# Patient Record
Sex: MALE | Race: WHITE | NOT HISPANIC OR LATINO | Employment: FULL TIME | URBAN - METROPOLITAN AREA
[De-identification: names, ages, dates, MRNs, and addresses within clinical notes are randomized per-mention and may not be internally consistent; named-entity substitution may affect disease eponyms.]

---

## 2019-12-04 ENCOUNTER — OFFICE VISIT (OUTPATIENT)
Dept: ORTHOPEDIC SURGERY | Facility: CLINIC | Age: 34
End: 2019-12-04

## 2019-12-04 VITALS — WEIGHT: 217.4 LBS | HEIGHT: 67 IN | OXYGEN SATURATION: 98 % | BODY MASS INDEX: 34.12 KG/M2 | HEART RATE: 83 BPM

## 2019-12-04 DIAGNOSIS — M23.92 KNEE INTERNAL DERANGEMENT, LEFT: ICD-10-CM

## 2019-12-04 DIAGNOSIS — M25.561 PAIN IN BOTH KNEES, UNSPECIFIED CHRONICITY: Primary | ICD-10-CM

## 2019-12-04 DIAGNOSIS — M25.562 PAIN IN BOTH KNEES, UNSPECIFIED CHRONICITY: Primary | ICD-10-CM

## 2019-12-04 DIAGNOSIS — M94.261 CHONDROMALACIA OF KNEE, RIGHT: ICD-10-CM

## 2019-12-04 PROCEDURE — 99204 OFFICE O/P NEW MOD 45 MIN: CPT | Performed by: ORTHOPAEDIC SURGERY

## 2019-12-04 RX ORDER — VALACYCLOVIR HYDROCHLORIDE 1 G/1
2000 TABLET, FILM COATED ORAL EVERY 12 HOURS
Refills: 3 | COMMUNITY
Start: 2019-11-26

## 2019-12-04 RX ORDER — PANTOPRAZOLE SODIUM 40 MG/1
40 TABLET, DELAYED RELEASE ORAL 2 TIMES DAILY
Refills: 1 | COMMUNITY
Start: 2019-11-15

## 2019-12-04 RX ORDER — ATORVASTATIN CALCIUM 20 MG/1
20 TABLET, FILM COATED ORAL DAILY
Refills: 4 | COMMUNITY
Start: 2019-09-18

## 2019-12-04 NOTE — PROGRESS NOTES
[facility] Note    Subjective     Chief Complaint   Patient presents with   • Right Knee - Pain   • Left Knee - Pain        HPI  Clementina Segura is a 34 y.o. male.  Patient complains of bilateral knee pain.  The right knee has hurt worse the past few months.  Popping and grinding pain up and down stairs.  The left knee is more severe with giving out mechanical symptoms completely giving way.  Patient had a cortisone shot 3 years ago.  Pain is 3 out of 10 dull and burning.  He works as a .  He has swelling popping grinding but most concerning giving way in the left knee    Past Medical History:   Diagnosis Date   • Anxiety    • High cholesterol    • Hypertension       Past Surgical History:   Procedure Laterality Date   • VARICOSE VEIN SURGERY        Family History   Problem Relation Age of Onset   • Cancer Mother    • Osteoarthritis Mother    • Hypertension Mother    • Diabetes Mother    • Cancer Father    • Stroke Father    • Osteoarthritis Father    • Hypertension Father    • Diabetes Father    • Heart attack Father      Social History     Socioeconomic History   • Marital status: Single     Spouse name: Not on file   • Number of children: Not on file   • Years of education: Not on file   • Highest education level: Not on file   Tobacco Use   • Smoking status: Current Every Day Smoker     Packs/day: 1.00     Types: Cigarettes   • Smokeless tobacco: Never Used   Substance and Sexual Activity   • Alcohol use: Yes   • Drug use: No   • Sexual activity: Defer      Current Outpatient Medications on File Prior to Visit   Medication Sig Dispense Refill   • ALPRAZolam (XANAX) 0.5 MG tablet Take 0.5 mg by mouth 2 (Two) Times a Day As Needed for Anxiety.     • atorvastatin (LIPITOR) 20 MG tablet Take 20 mg by mouth Daily.  4   • pantoprazole (PROTONIX) 40 MG EC tablet Take 40 mg by mouth 2 (Two) Times a Day.  1   • hydrochlorothiazide (HYDRODIURIL) 25 MG tablet Take 25 mg by mouth  "Daily.     • valACYclovir (VALTREX) 1000 MG tablet Take 2,000 mg by mouth Every 12 (Twelve) Hours.  3     No current facility-administered medications on file prior to visit.       Allergies   Allergen Reactions   • Penicillins Hives   • Shellfish-Derived Products Rash and Other (See Comments)     Trouble breathing          The following portions of the patient's history were reviewed and updated as appropriate: allergies, current medications, past family history, past medical history, past social history, past surgical history and problem list.    Review of Systems   Constitutional: Negative.    HENT: Positive for dental problem and mouth sores.    Eyes: Negative.    Respiratory: Negative.    Cardiovascular: Positive for leg swelling.   Gastrointestinal: Positive for nausea.   Endocrine: Negative.    Genitourinary: Negative.    Musculoskeletal: Positive for arthralgias.   Skin: Negative.    Allergic/Immunologic: Positive for food allergies.   Neurological: Negative.    Hematological: Negative.    Psychiatric/Behavioral: Negative.         Objective      Physical Exam  Pulse 83   Ht 170 cm (66.93\")   Wt 98.6 kg (217 lb 6.4 oz)   SpO2 98%   BMI 34.12 kg/m²     Body mass index is 34.12 kg/m².    GENERAL APPEARANCE: awake, alert & oriented x 3, in no acute distress and well developed, well nourished  PSYCH: normal mood and affect  LUNGS:  breathing nonlabored, no wheezing  EYES: sclera anicteric, pupils equal  CARDIOVASCULAR: palpable pulses dorsalis pedis, palpable posterior tibial bilaterally. Capillary refill less than 2 seconds  INTEGUMENTARY: skin intact, no clubbing, cyanosis  NEUROLOGIC:  Normal Sensation and reflexes       Ortho Exam  Peripheral Vascular:    Upper Extremity:   Inspection:  Left--no cyanotic nail beds Right--no cyanotic nail beds   Bilateral:  Pink nail beds with brisk capillary refill   Palpation:  Bilateral radial pulse normal    Musculoskeletal:  Global Assessment:  Overall assessment of " Lower Extremity Muscle Strength and Tone:  Left quadriceps--5/5   Left hamstrings--5/5       Left tibialis anterior--5/5  Left gastroc-soleus--5/5  Left EHL--5/5    Right quadriceps--5/5  Right hamstrings--5/5  Right tibialis anterior--5/5  Right gastroc soleus--5/5  Right EHL--5/5    Lower Extremity:  Knee/Patella:  No digital clubbing or cyanosis.    Examination of left and right knees reveals:  Normal deep tendon reflexes, coordination, strength, tone, sensation.  No known fractures or deformities.    Inspection and Palpation:    Left knee:  Tenderness: Medial joint line tenderness with positive Michael  Effusion:  none  Crepitus: Positive  Pulses:  2+  Ecchymosis:  None  Warmth:  None     Right knee:  Tenderness:  none  Effusion:  none  Crepitus:  none  Pulses:  2+  Ecchymosis:  None  Warmth:  None     ROM:  Right:  Extension:0    Flexion:135  Left:  Extension:0     Flexion:135    Instability:    Left:  Lachman Test:  Negative, Varus stress test negative, Valgus stress test negative   Anterior Drawer Test:  Negative, Posterior Drawer Test:  Negative    Right:  Lachman Test:  Negative, Varus stress test negative, Valgus stress test negative   Anterior Drawer Test:  Negative, Posterior Drawer Test:  Negative    Deformities/Malalignments/Discrepancies:    Left:  none  Right:  none    Functional Testing:  Right:  Michael's test: Positive  Patella grind test: Positive  Q-angle:  Normal  Apprehension Sign:  Negative      Left:  Michael's test:  Negative  Patella grind test:  Negative  Q-angle:  Normal  Apprehension Sign:  Negative    Imaging/Studies  Imaging Results (Last 7 Days)     Procedure Component Value Units Date/Time    XR Knee 3 View Bilateral [[id number]] Resulted:  12/04/19 0824     Updated:  12/04/19 0825    Narrative:       Bilateral Knee X-Rays  Indication: Pain    Upright AP, Lateral, skiers and Sunrise views of bilateral knees     Findings:  No fracture  No bony lesion  Normal soft tissues  Normal  joint spaces    No prior studies were available for comparison.              Assessment/Plan        ICD-10-CM ICD-9-CM   1. Pain in both knees, unspecified chronicity M25.561 719.46    M25.562    2. Knee internal derangement, left M23.92 717.9   3. Chondromalacia of knee, right M94.261 717.7       Orders Placed This Encounter   Procedures   • XR Knee 3 View Bilateral   • MRI Knee Left Without Contrast   • Ambulatory Referral to Physical Therapy Evaluate and treat, Ortho      I am most concerned about the left knee giving way.  The left knee has mechanical symptoms and has failed previous physical therapy at [facility] and a cortisone injection with a year ago.  I believe there is most likely medial meniscus tear in the left knee.  The right knee has chondromalacia and should respond well to physical therapy.  I will see the patient back after the MRI.    Medical Decision Making  Management Options : over-the-counter medicine and physical/occupational therapy  Data/Risk: radiology tests and independent visualization of imaging, lab tests, or EMG/NCV    Shon Aleman MD  12/04/19  8:35 AM         EMR Dragon/Transcription disclaimer:  Much of this encounter note is an electronic transcription of spoken language to printed text. Electronic transcription of spoken language may permit erroneous, or at times, nonsensical words or phrases to be inadvertently transcribed. Although I have reviewed the note for such errors, some may still exist.

## 2019-12-14 ENCOUNTER — HOSPITAL ENCOUNTER (OUTPATIENT)
Dept: MRI IMAGING | Facility: HOSPITAL | Age: 34
Discharge: HOME | End: 2019-12-14
Admitting: ORTHOPAEDIC SURGERY

## 2019-12-14 DIAGNOSIS — M23.92 KNEE INTERNAL DERANGEMENT, LEFT: ICD-10-CM

## 2019-12-14 PROCEDURE — 73721 MRI JNT OF LWR EXTRE W/O DYE: CPT

## 2020-01-08 ENCOUNTER — OFFICE VISIT (OUTPATIENT)
Dept: ORTHOPEDIC SURGERY | Facility: CLINIC | Age: 35
End: 2020-01-08

## 2020-01-08 VITALS — BODY MASS INDEX: 33.9 KG/M2 | HEIGHT: 67 IN | OXYGEN SATURATION: 99 % | WEIGHT: 216 LBS | HEART RATE: 76 BPM

## 2020-01-08 DIAGNOSIS — M22.2X2 PATELLOFEMORAL PAIN SYNDROME OF LEFT KNEE: ICD-10-CM

## 2020-01-08 DIAGNOSIS — M94.261 CHONDROMALACIA OF KNEE, RIGHT: Primary | ICD-10-CM

## 2020-01-08 DIAGNOSIS — M94.262 CHONDROMALACIA OF LEFT KNEE: ICD-10-CM

## 2020-01-08 PROCEDURE — 99213 OFFICE O/P EST LOW 20 MIN: CPT | Performed by: ORTHOPAEDIC SURGERY

## 2020-01-08 NOTE — PROGRESS NOTES
[facility]    Subjective     Chief Complaint   Patient presents with   • Left Knee - Follow-up     After MRI 12/14/2019        RAYMOND Segura is a 34 y.o. male.  He is follow-up left knee MRI.  Right knee is feeling better.  Left knee still has pain 6 out of 10 and is unchanged.  He has not yet started physical therapy.  He has had a problem for 2 years worse the past 6 months.  He is working full duty.    Past Medical History:   Diagnosis Date   • Anxiety    • High cholesterol    • Hypertension       Past Surgical History:   Procedure Laterality Date   • VARICOSE VEIN SURGERY        Family History   Problem Relation Age of Onset   • Cancer Mother    • Osteoarthritis Mother    • Hypertension Mother    • Diabetes Mother    • Cancer Father    • Stroke Father    • Osteoarthritis Father    • Hypertension Father    • Diabetes Father    • Heart attack Father      Social History     Socioeconomic History   • Marital status: Single     Spouse name: Not on file   • Number of children: Not on file   • Years of education: Not on file   • Highest education level: Not on file   Tobacco Use   • Smoking status: Current Every Day Smoker     Packs/day: 1.00     Types: Cigarettes   • Smokeless tobacco: Never Used   Substance and Sexual Activity   • Alcohol use: Yes   • Drug use: No   • Sexual activity: Defer      Current Outpatient Medications on File Prior to Visit   Medication Sig Dispense Refill   • ALPRAZolam (XANAX) 0.5 MG tablet Take 0.5 mg by mouth 2 (Two) Times a Day As Needed for Anxiety.     • atorvastatin (LIPITOR) 20 MG tablet Take 20 mg by mouth Daily.  4   • hydrochlorothiazide (HYDRODIURIL) 25 MG tablet Take 25 mg by mouth Daily.     • pantoprazole (PROTONIX) 40 MG EC tablet Take 40 mg by mouth 2 (Two) Times a Day.  1   • valACYclovir (VALTREX) 1000 MG tablet Take 2,000 mg by mouth Every 12 (Twelve) Hours.  3     No current facility-administered medications on file prior to visit.      "  Allergies   Allergen Reactions   • Penicillins Hives   • Shellfish-Derived Products Rash and Other (See Comments)     Trouble breathing          The following portions of the patient's history were reviewed and updated as appropriate: allergies, current medications, past family history, past medical history, past social history, past surgical history and problem list.    Review of Systems   Constitutional: Negative.    HENT: Negative.    Eyes: Negative.    Respiratory: Negative.    Cardiovascular: Negative.    Gastrointestinal: Negative.    Endocrine: Negative.    Genitourinary: Negative.    Musculoskeletal: Positive for arthralgias.   Skin: Negative.    Allergic/Immunologic: Negative.    Neurological: Negative.    Hematological: Negative.    Psychiatric/Behavioral: Negative.         Objective      Physical Exam  Pulse 76   Ht 170 cm (66.93\")   Wt 98 kg (216 lb)   SpO2 99%   BMI 33.90 kg/m²     Body mass index is 33.9 kg/m².    GENERAL APPEARANCE: awake, alert & oriented x 3, in no acute distress and well developed, well nourished  PSYCH: normal mood and affect  He has no change in his bilateral knee exam.  He walks with a normal gait with crepitus.    Imaging/Studies  Imaging Results (Last 7 Days)     ** No results found for the last 168 hours. **      I Viewed his left knee MRI from December 14 which shows patella chondromalacia    Assessment/Plan        ICD-10-CM ICD-9-CM   1. Chondromalacia of knee, right M94.261 717.7   2. Chondromalacia of left knee M94.262 717.7   3. Patellofemoral pain syndrome of left knee M22.2X2 719.46       Orders Placed This Encounter   Procedures   • Ambulatory Referral to Physical Therapy Evaluate and treat, Ortho      I have ordered a patella stabilizing brace.  Physical therapy on [facility].  He will follow-up in 6 weeks.  We discussed all treatment options including medicine, shots and surgery.  He has a family member scheduled to have a knee replacement soon.    Medical " Decision Making  Management Options : over-the-counter medicine and physical/occupational therapy  Data/Risk: radiology tests and independent visualization of imaging, lab tests, or EMG/NCV    Shon Aleman MD  01/08/20  8:39 AM         EMR Dragon/Transcription disclaimer:  Much of this encounter note is an electronic transcription of spoken language to printed text. Electronic transcription of spoken language may permit erroneous, or at times, nonsensical words or phrases to be inadvertently transcribed. Although I have reviewed the note for such errors, some may still exist.

## 2020-01-09 ENCOUNTER — TREATMENT (OUTPATIENT)
Dept: PHYSICAL THERAPY | Facility: CLINIC | Age: 35
End: 2020-01-09

## 2020-01-09 DIAGNOSIS — M25.562 CHRONIC PAIN OF BOTH KNEES: Primary | ICD-10-CM

## 2020-01-09 DIAGNOSIS — G89.29 CHRONIC PAIN OF BOTH KNEES: Primary | ICD-10-CM

## 2020-01-09 DIAGNOSIS — M25.561 CHRONIC PAIN OF BOTH KNEES: Primary | ICD-10-CM

## 2020-01-09 PROCEDURE — 97162 PT EVAL MOD COMPLEX 30 MIN: CPT | Performed by: PHYSICAL THERAPIST

## 2020-01-09 PROCEDURE — 97110 THERAPEUTIC EXERCISES: CPT | Performed by: PHYSICAL THERAPIST

## 2020-01-09 PROCEDURE — G0283 ELEC STIM OTHER THAN WOUND: HCPCS | Performed by: PHYSICAL THERAPIST

## 2020-01-09 NOTE — PROGRESS NOTES
Physical Therapy Initial Evaluation and Plan of Care    TOTAL TIME: 85 MINUTES    Subjective Evaluation    History of Present Illness  Mechanism of injury: Patient presents with bilateral, Left > Right knee pain    Has been wearing a J-brace that is helping     Pain mostly with going up stairs, prolonged sitting   Likes to work out at gym; treadmill, bike and elliptical    Has a lot of crunching, grinding - chronic        Patient Occupation: customer  Pain  Current pain ratin  At worst pain ratin  Quality: dull ache, grinding, tight and discomfort  Relieving factors: heat, medications, change in position and support (Tylenol prn)  Aggravating factors: stairs, squatting, prolonged positioning, standing, movement and ambulation    Social Support  Lives in: multiple-level home and condominium    Patient Goals  Patient goals for therapy: increased strength, increased motion, decreased pain and return to sport/leisure activities  Patient goal: to be able to climb stairs            Objective       Observations   Left Knee   Negative for edema.     Right Knee   Negative for edema.     Tenderness     Additional Tenderness Details  No tenderness to palpate    Neurological Testing     Sensation     Knee   Left Knee   Intact: light touch    Right Knee   Intact: light touch     Reflexes   Left   Patellar (L4): normal (2+)  Achilles (S1): normal (2+)    Right   Patellar (L4): normal (2+)    Active Range of Motion   Left Knee   Normal active range of motion    Right Knee   Normal active range of motion    Strength/Myotome Testing     Left Knee   Flexion: 4+  Extension: 4+  Quadriceps contraction: fair    Right Knee   Flexion: 4+  Extension: 4+  Quadriceps contraction: fair    Tests     Left Knee   Positive patellar compression and patella-femoral grind.   Negative anterior drawer, bounce home, lateral Michael, medial Michael, patellar apprehension, valgus stress test at 30 degrees and varus stress test  at 30 degrees.     Right Knee   Positive patellar compression and patella-femoral grind.   Negative anterior drawer, bounce home, lateral Michael, medial Michael, patellar apprehension, valgus stress test at 30 degrees and varus stress test at 30 degrees.          Assessment & Plan     Assessment  Impairments: activity intolerance, impaired physical strength, lacks appropriate home exercise program and pain with function  Assessment details: Patient presents with bilateral knee pain consistent with PFSS / chondromalacia; weak hip ER/ABD, tight hamstrings bilaterally; should respond well to PT for modalities, bracing/taping, therex, NM re-ed, manual therapy in order to improve functional strength and decrease pain  Prognosis: good  Functional Limitations: lifting, walking, uncomfortable because of pain and sitting  Goals  Plan Goals: 4 weeks:  1. IND with HEP  2. Patient to display 5/5 strength of LE's without pain  3. Improve LEFS from 62/80 to > 70/80  4. Patient to be able to sit for prolonged periods of time, climb stairs and workout without pain    Plan  Therapy options: will be seen for skilled physical therapy services  Planned modality interventions: iontophoresis, TENS, thermotherapy (hydrocollator packs), ultrasound, high voltage pulsed current (pain management), electrical stimulation/Russian stimulation and cryotherapy  Planned therapy interventions: manual therapy, neuromuscular re-education, soft tissue mobilization, strengthening, stretching, therapeutic activities, joint mobilization, home exercise program, gait training, functional ROM exercises, flexibility and balance/weight-bearing training  Frequency: 2x week  Duration in visits: 8  Treatment plan discussed with: patient        Manual Therapy:         mins  51678;  Therapeutic Exercise:    30     mins  19988;     Neuromuscular Vince:        mins  42473;    Therapeutic Activity:          mins  38790;     Gait Training:           mins  08876;      Ultrasound:          mins  54830;    Electrical Stimulation:    15     mins  15914 ( );  Dry Needling          mins self-pay    Timed Treatment:   45   mins   Total Treatment:     85   mins    PT SIGNATURE: Shant Pepe, PT   DATE TREATMENT INITIATED: 1/9/2020    Initial Certification  Certification Period: 4/8/2020  I certify that the therapy services are furnished while this patient is under my care.  The services outlined above are required by this patient, and will be reviewed every 90 days.     PHYSICIAN: Shon Aleman MD      DATE:     Please sign and return via fax to  .. Thank you, [facility].

## 2020-01-22 ENCOUNTER — TREATMENT (OUTPATIENT)
Dept: PHYSICAL THERAPY | Facility: CLINIC | Age: 35
End: 2020-01-22

## 2020-01-22 DIAGNOSIS — M25.561 CHRONIC PAIN OF BOTH KNEES: Primary | ICD-10-CM

## 2020-01-22 DIAGNOSIS — M25.562 CHRONIC PAIN OF BOTH KNEES: Primary | ICD-10-CM

## 2020-01-22 DIAGNOSIS — G89.29 CHRONIC PAIN OF BOTH KNEES: Primary | ICD-10-CM

## 2020-01-22 PROCEDURE — 97110 THERAPEUTIC EXERCISES: CPT | Performed by: PHYSICAL THERAPIST

## 2020-01-22 PROCEDURE — G0283 ELEC STIM OTHER THAN WOUND: HCPCS | Performed by: PHYSICAL THERAPIST

## 2020-01-22 PROCEDURE — 97140 MANUAL THERAPY 1/> REGIONS: CPT | Performed by: PHYSICAL THERAPIST

## 2020-01-22 NOTE — PROGRESS NOTES
Physical Therapy Daily Progress Note    TOTAL TIME: 60 MINUTES    Clementina Segura reports: patient reports that knee pain is 'a lot better' ; have been doing the treadmill for 20 minutes, wear brace for that; don't feel like I need the brace other than for treadmill; swelling has been a lot better ; right knee is crunching a lot and hurting more ; exercises       Objective   See Exercise, Manual, and Modality Logs for complete treatment.     Right knee manual PFJ mobs, knee flexion / ext mobs x 20 mins    THERAPEUTIC EXERCISES/ACTIVITIES ADDED TODAY: see flow sheets; added previous hip/knee exercises for right knee    Assessment/Plan  Patient progressing well, needs continued PT to decrease bilateral knee pain    Progress per Plan of Care and Progress strengthening /stabilization /functional activity           Manual Therapy:    20     mins  14646;  Therapeutic Exercise:    25     mins  65479;     Neuromuscular Vince:        mins  32011;    Therapeutic Activity:          mins  88708;     Gait Training:           mins  93527;     Ultrasound:          mins  53475;    Electrical Stimulation:    15     mins  38887 ( );  Dry Needling          mins self-pay    Timed Treatment:   60   mins   Total Treatment:     60   mins    Shant Pepe PT  Physical Therapist

## 2021-08-20 ENCOUNTER — TRANSCRIBE ORDERS (OUTPATIENT)
Dept: ADMINISTRATIVE | Facility: HOSPITAL | Age: 36
End: 2021-08-20

## 2021-08-20 DIAGNOSIS — R13.13 PHARYNGEAL DYSPHAGIA: Primary | ICD-10-CM

## 2021-09-10 ENCOUNTER — APPOINTMENT (OUTPATIENT)
Dept: PREADMISSION TESTING | Facility: HOSPITAL | Age: 36
End: 2021-09-10

## 2021-09-13 ENCOUNTER — APPOINTMENT (OUTPATIENT)
Dept: GENERAL RADIOLOGY | Facility: HOSPITAL | Age: 36
End: 2021-09-13

## 2025-02-03 ENCOUNTER — HOSPITAL ENCOUNTER (OUTPATIENT)
Facility: HOSPITAL | Age: 40
Discharge: HOME | End: 2025-02-03
Payer: COMMERCIAL

## 2025-02-03 ENCOUNTER — TRANSCRIBE ORDERS (OUTPATIENT)
Facility: HOSPITAL | Age: 40
End: 2025-02-03
Payer: COMMERCIAL

## 2025-02-03 ENCOUNTER — LAB (OUTPATIENT)
Facility: HOSPITAL | Age: 40
End: 2025-02-03
Payer: COMMERCIAL

## 2025-02-03 DIAGNOSIS — Z01.818 PRE-OP EVALUATION: ICD-10-CM

## 2025-02-03 DIAGNOSIS — K60.30 ANAL FISTULA: ICD-10-CM

## 2025-02-03 DIAGNOSIS — K60.2 ANAL FISSURE: Primary | ICD-10-CM

## 2025-02-03 DIAGNOSIS — K60.2 ANAL FISSURE: ICD-10-CM

## 2025-02-03 DIAGNOSIS — Z01.818 PRE-OP EVALUATION: Primary | ICD-10-CM

## 2025-02-03 LAB
ANION GAP SERPL CALCULATED.3IONS-SCNC: 9 MMOL/L (ref 5–15)
BUN SERPL-MCNC: 12 MG/DL (ref 6–20)
BUN/CREAT SERPL: 12.8 (ref 7–25)
CALCIUM SPEC-SCNC: 9.2 MG/DL (ref 8.6–10.5)
CHLORIDE SERPL-SCNC: 107 MMOL/L (ref 98–107)
CO2 SERPL-SCNC: 24 MMOL/L (ref 22–29)
CREAT SERPL-MCNC: 0.94 MG/DL (ref 0.76–1.27)
DEPRECATED RDW RBC AUTO: 42.1 FL (ref 37–54)
EGFRCR SERPLBLD CKD-EPI 2021: 105.8 ML/MIN/1.73
ERYTHROCYTE [DISTWIDTH] IN BLOOD BY AUTOMATED COUNT: 12.4 % (ref 12.3–15.4)
GLUCOSE SERPL-MCNC: 106 MG/DL (ref 65–99)
HCT VFR BLD AUTO: 43.8 % (ref 37.5–51)
HGB BLD-MCNC: 14.8 G/DL (ref 13–17.7)
MCH RBC QN AUTO: 31.4 PG (ref 26.6–33)
MCHC RBC AUTO-ENTMCNC: 33.8 G/DL (ref 31.5–35.7)
MCV RBC AUTO: 93 FL (ref 79–97)
PLATELET # BLD AUTO: 193 10*3/MM3 (ref 140–450)
PMV BLD AUTO: 11.5 FL (ref 6–12)
POTASSIUM SERPL-SCNC: 4.2 MMOL/L (ref 3.5–5.2)
RBC # BLD AUTO: 4.71 10*6/MM3 (ref 4.14–5.8)
SODIUM SERPL-SCNC: 140 MMOL/L (ref 136–145)
WBC NRBC COR # BLD AUTO: 6.89 10*3/MM3 (ref 3.4–10.8)

## 2025-02-03 PROCEDURE — 85027 COMPLETE CBC AUTOMATED: CPT

## 2025-02-03 PROCEDURE — 93005 ELECTROCARDIOGRAM TRACING: CPT | Performed by: COLON & RECTAL SURGERY

## 2025-02-03 PROCEDURE — 80048 BASIC METABOLIC PNL TOTAL CA: CPT

## 2025-02-03 PROCEDURE — 36415 COLL VENOUS BLD VENIPUNCTURE: CPT

## 2025-02-04 LAB
QT INTERVAL: 366 MS
QTC INTERVAL: 403 MS

## 2025-07-01 ENCOUNTER — OFFICE VISIT (OUTPATIENT)
Age: 40
End: 2025-07-01
Payer: COMMERCIAL

## 2025-07-01 VITALS
BODY MASS INDEX: 30.68 KG/M2 | HEIGHT: 66 IN | SYSTOLIC BLOOD PRESSURE: 130 MMHG | WEIGHT: 190.9 LBS | DIASTOLIC BLOOD PRESSURE: 88 MMHG

## 2025-07-01 DIAGNOSIS — S46.812A STRAIN OF LEFT TRAPEZIUS MUSCLE, INITIAL ENCOUNTER: ICD-10-CM

## 2025-07-01 DIAGNOSIS — G25.89 SCAPULAR DYSKINESIS: ICD-10-CM

## 2025-07-01 DIAGNOSIS — M75.42 IMPINGEMENT SYNDROME OF LEFT SHOULDER: Primary | ICD-10-CM

## 2025-07-01 DIAGNOSIS — M89.8X1 PERISCAPULAR PAIN: ICD-10-CM

## 2025-07-01 RX ORDER — ALPRAZOLAM 0.25 MG
0.25 TABLET ORAL 2 TIMES DAILY PRN
COMMUNITY

## 2025-07-01 RX ORDER — LORATADINE 10 MG/1
CAPSULE, LIQUID FILLED ORAL
COMMUNITY

## 2025-07-01 RX ORDER — EMTRICITABINE AND TENOFOVIR DISOPROXIL FUMARATE 200; 300 MG/1; MG/1
TABLET, FILM COATED ORAL
COMMUNITY

## 2025-07-01 RX ORDER — METHYLPREDNISOLONE 4 MG/1
TABLET ORAL
Qty: 1 EACH | Refills: 0 | Status: SHIPPED | OUTPATIENT
Start: 2025-07-01

## 2025-07-01 RX ORDER — TRIAMCINOLONE ACETONIDE 1 MG/G
CREAM TOPICAL
COMMUNITY

## 2025-07-01 RX ORDER — MULTIPLE VITAMINS W/ MINERALS TAB 9MG-400MCG
1 TAB ORAL DAILY
COMMUNITY

## 2025-07-01 RX ORDER — PHENTERMINE AND TOPIRAMATE 7.5; 46 MG/1; MG/1
1 CAPSULE, EXTENDED RELEASE ORAL DAILY
COMMUNITY
Start: 2024-03-01

## 2025-07-01 RX ORDER — LISINOPRIL 2.5 MG/1
2.5 TABLET ORAL DAILY
COMMUNITY

## 2025-07-01 NOTE — PROGRESS NOTES
[facility] Orthopaedic Surgery Office Visit - Bobo Fink MD  [facility]    Office Visit       Patient Name: Clementina Segura    Chief Complaint:   Chief Complaint   Patient presents with   • Left Shoulder - Pain       Referring Physician: Referring, Self  - I appreciate the referral    History of Present Illness:   Clementina Segura is a 40 y.o. male who presents with left body part: shoulder Reason: pain.  Onset:Onset: atraumatic and gradual in nature. The issue has been ongoing for 4 month(s). Pain is a 8/10 on the pain scale. Pain is described as Pain Characterization: dull, aching, burning, throbbing, stabbing, and shooting. Associated symptoms include Symptoms: pain and stiffness. The pain is worse with any movement of the joint; pain medication and/or NSAID improve the pain. Previous treatments have included: NSAIDS.  I have reviewed the patient's history of present illness as noted/entered above.    I have reviewed the patient's past medical history, surgical history, social history, family history, medications, and allergies as noted in the electronic medical record and as noted/entered.  I have reviewed the patient's review of systems as noted/enter and updated as noted in the patient's HPI.    Left shoulder pain  Pain worse over the last 4 months rates the pain as 8 of 10  Dull aching burning throbbing stabbing shooting pain  Pain and stiffness  Treated with NSAIDs, activity modifications  [place]  40-year-old male left shoulder pain    6/24/2025 right shoulder 3 views reviewed no acute bony changes noted.  Mild degenerative AC joint changes.  Outside facility/facility for.  I personally reviewed images.  Chiropractic care at [facility].  Currently alternating naproxen and ibuprofen just for nighttime use.  Topical helping as well chiropractic care helped some but pain has persisted down to  an 8 or 9 out of 10.  He did have surgery with Dr. King in February and was more sedentary and this may have been the onset but no trauma.  Occasional numbness and tingling.    Upcoming trip to [place] in [facility] with his family        40 y.o. male  Body mass index is 30.81 kg/m².    Subjective   Subjective      Review of Systems   Constitutional: Negative.  Negative for chills, fatigue and fever.   HENT: Negative.  Negative for congestion and dental problem.    Eyes: Negative.  Negative for blurred vision.   Respiratory: Negative.  Negative for shortness of breath.    Cardiovascular: Negative.  Negative for leg swelling.   Gastrointestinal: Negative.  Negative for abdominal pain.   Endocrine: Negative.  Negative for polyuria.   Genitourinary: Negative.  Negative for difficulty urinating.   Musculoskeletal:  Positive for arthralgias.   Skin: Negative.    Allergic/Immunologic: Negative.    Neurological: Negative.    Hematological: Negative.  Negative for adenopathy.   Psychiatric/Behavioral: Negative.  Negative for behavioral problems.         Past Medical History:   Past Medical History:   Diagnosis Date   • Allergic    • Anxiety    • Arthritis    • CTS (carpal tunnel syndrome) 2016    Right Hand   • Fracture, fibula 2021    Left Knee   • High cholesterol    • Hypertension    • Knee swelling 2021    Left Knee   • Tear of meniscus of knee 2021    Left Knee   • Tendinitis of knee 2021    Left and Right       Past Surgical History:   Past Surgical History:   Procedure Laterality Date   • HAND SURGERY  2006  and 2026    Left and Right   • VARICOSE VEIN SURGERY     • WRIST SURGERY  2016 2016       Family History:   Family History   Problem Relation Age of Onset   • Cancer Mother         lung, breast, throat, colon, and kidney cancer.   • Osteoarthritis Mother    • Hypertension Mother    • Diabetes Mother    • Osteoporosis Mother    • Cancer Father    • Stroke Father    • Osteoarthritis Father    •  Hypertension Father    • Diabetes Father    • Heart attack Father        Social History:   Social History     Socioeconomic History   • Marital status: Single   Tobacco Use   • Smoking status: Former     Current packs/day: 1.00     Average packs/day: 0.7 packs/day for 20.0 years (15.0 ttl pk-yrs)     Types: Cigarettes     Start date: 1/1/2025   • Smokeless tobacco: Never   Vaping Use   • Vaping status: Former   Substance and Sexual Activity   • Alcohol use: Not Currently     Alcohol/week: 2.0 standard drinks of alcohol     Types: 2 Glasses of wine per week   • Drug use: Never   • Sexual activity: Yes     Partners: Male       Medications:   Current Outpatient Medications:   •  ALPRAZolam (XANAX) 0.25 MG tablet, Take 1 tablet by mouth 2 (Two) Times a Day As Needed. for anxiety, Disp: , Rfl:   •  atorvastatin (LIPITOR) 20 MG tablet, Take 1 tablet by mouth Daily., Disp: , Rfl: 4  •  emtricitabine-tenofovir (TRUVADA) 200-300 MG per tablet, , Disp: , Rfl:   •  lisinopril (PRINIVIL,ZESTRIL) 2.5 MG tablet, Take 1 tablet by mouth Daily., Disp: , Rfl:   •  Loratadine (Claritin) 10 MG capsule, , Disp: , Rfl:   •  multivitamin with minerals tablet tablet, Take 1 tablet by mouth Daily., Disp: , Rfl:   •  Qsymia 7.5-46 MG capsule sustained-release 24 hr, Take 1 capsule by mouth Daily., Disp: , Rfl:   •  triamcinolone (KENALOG) 0.1 % cream, APPLY CREAM EXTERNALLY TWICE DAILY AVOID FACE, GENITALS AND BODY FOLDS, Disp: , Rfl:   •  methylPREDNISolone (MEDROL) 4 MG dose pack, Use as directed by package instructions, Disp: 1 each, Rfl: 0    Allergies:   Allergies   Allergen Reactions   • Bupropion Headache and Rash   • Naltrexone-Bupropion Hcl Er Hives   • Penicillins Hives   • Shellfish-Derived Products Rash and Other (See Comments)     Trouble breathing         The following portions of the patient's history were reviewed and updated as appropriate: allergies, current medications, past family history, past medical history, past  "social history, past surgical history and problem list.        Objective    Objective      Vital Signs:   Vitals:    07/01/25 0844   BP: 130/88   Weight: 86.6 kg (190 lb 14.4 oz)   Height: 167.6 cm (66\")       Ortho Exam:  Left shoulder periscapular pain rhomboid/upper trapezius  Scapular dyskinesis good rotator cuff strength on exam today positive Neer positive Ann    Results Review:   Imaging Results (Last 24 Hours)       ** No results found for the last 24 hours. **            6/24/2025 right shoulder 3 views reviewed no acute bony changes noted.  Mild degenerative AC joint changes.  Outside facility, [facility].  I personally reviewed images.    Procedures             Assessment / Plan      Assessment/Plan:   Problem List Items Addressed This Visit          Musculoskeletal and Injuries    Periscapular pain    Relevant Medications    methylPREDNISolone (MEDROL) 4 MG dose pack    Other Relevant Orders    Ambulatory Referral to Physical Therapy for Evaluation & Treatment    Strain of left trapezius muscle    Relevant Medications    methylPREDNISolone (MEDROL) 4 MG dose pack    Other Relevant Orders    Ambulatory Referral to Physical Therapy for Evaluation & Treatment    Impingement syndrome of left shoulder - Primary    Relevant Medications    methylPREDNISolone (MEDROL) 4 MG dose pack    Other Relevant Orders    Ambulatory Referral to Physical Therapy for Evaluation & Treatment       Neuro    Scapular dyskinesis    Relevant Medications    methylPREDNISolone (MEDROL) 4 MG dose pack    Other Relevant Orders    Ambulatory Referral to Physical Therapy for Evaluation & Treatment       Left shoulder periscapular pain.  Counseled on steroid Dosepak, hold the NSAIDs until the Dosepak is completed.  Physical therapy and counseled on home exercise program.  If not improving MRI would be the next step.    Follow Up: 2-month  X-rays at next clinic appointment: None        Bobo Fink MD, FAAOS  Orthopedic Surgeon, Shoulder " Surgery  [facility]  Orthopedics and Sports Medicine  [address], [address]  [place] [address]    & New Location:  [facility] Location  [address], [address]  [place] [address]    07/01/25  09:50 EDT

## 2025-07-11 ENCOUNTER — TREATMENT (OUTPATIENT)
Dept: PHYSICAL THERAPY | Facility: CLINIC | Age: 40
End: 2025-07-11
Payer: COMMERCIAL

## 2025-07-11 DIAGNOSIS — M25.512 ACUTE PAIN OF LEFT SHOULDER: Primary | ICD-10-CM

## 2025-07-11 PROCEDURE — 97110 THERAPEUTIC EXERCISES: CPT | Performed by: PHYSICAL THERAPIST

## 2025-07-11 PROCEDURE — 97161 PT EVAL LOW COMPLEX 20 MIN: CPT | Performed by: PHYSICAL THERAPIST

## 2025-07-11 NOTE — PROGRESS NOTES
Physical Therapy Initial Evaluation and Plan of Care  [address]    [place] [address]  [phone]    Patient: Clementina Segura   : 1985  Diagnosis/ICD-10 Code:  Acute pain of left shoulder [M25.512]  Referring practitioner: Bobo Fink MD  Date of Initial Visit: 2025  Today's Date: 2025  Patient seen for 1 session         Visit Diagnoses:    ICD-10-CM ICD-9-CM   1. Acute pain of left shoulder  M25.512 719.41         Subjective Questionnaire: QuickDASH: 27.27% impairment    Patient Active Problem List    Diagnosis Date Noted    Scapular dyskinesis 2025    Periscapular pain 2025    Strain of left trapezius muscle 2025    Impingement syndrome of left shoulder 2025     Past Medical History:   Diagnosis Date    Allergic     Anxiety     Arthritis     CTS (carpal tunnel syndrome)     Right Hand    Fracture, fibula     Left Knee    High cholesterol     Hypertension     Knee swelling     Left Knee    Tear of meniscus of knee     Left Knee    Tendinitis of knee     Left and Right     Past Surgical History:   Procedure Laterality Date    HAND SURGERY    and     Left and Right    VARICOSE VEIN SURGERY      WRIST SURGERY  2016       Subjective Evaluation    History of Present Illness  Mechanism of injury: Colon surgery in February, 2 weeks bedrest laying on L side watching TV for most it, pn onset in March. Fairly localized to medial scapular border but at times could radiate down arm toward elbow, associated w/ infrequent N/T. Pn present most of the time, not necessarily worsened by neck or shoulder movement but by activity in general. Did chiro until a few weeks ago, would only provide short term relief. Steroid pack helped quite a bit, reduced from 9 to 4 resting pn level. Usually very physically active. Recently returned from trip to [place], didn't cause much issue. New adjustable bed for home, sleeping more  reclined and on a firmer mattress which seems to help as well. Given some exercises from chiro he does every morning, banded rows, hangs from bar, use of massage gun or soft tissue release using a ball against the wall. Symptoms aggravated by sitting for long periods of time, repetitive activity.    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  Mild degenerative AC joint changes..    Patient Goals  Patient goals for therapy: decreased pain, increased motion, increased strength and return to sport/leisure activities           Treatment  See Exercise, Manual, and Modality Logs for complete treatment.     Access Code: [email or web address]  URL: [email or web address]  Date: 2025  Prepared by: Leigh Sanches    Exercises  - Seated Assisted Cervical Rotation with Towel (Mirrored)  - 1-2 x daily - 1-2 sets - 10 reps  - Standing Cervical Retraction  - 1-2 x daily - 1-2 sets - 10 reps  - Shoulder External Rotation and Scapular Retraction with Resistance  - 1-2 x daily - 2-3 sets - 10 reps  - Seated Thoracic Lumbar Extension with Pectoralis Stretch  - 1-2 x daily - 5 reps - 10 sec hold    Objective          Tenderness     Additional Tenderness Details  Mild hypertonicity and TTP L rhomboid/mid trap    Neurological Testing     Sensation     Shoulder   Left Shoulder   Intact: light touch    Active Range of Motion     Additional Active Range of Motion Details  L shoulder AROM WNL, mild medial scapular pn end range ER    Cervical AROM WNL and pn free    Passive Range of Motion     Additional Passive Range of Motion Details  (-) pn w/ PA gliding cervical segments    Scapular Mobility   Left Shoulder   Scapular mobility:  WFL    Strength/Myotome Testing     Left Shoulder     Planes of Motion   Flexion: 5   Abduction: 5   External rotation at 0°: 5   Internal rotation at 0°: 5     Isolated Muscles   Biceps: 5   Lower trapezius: 5   Middle trapezius: 4   Rhomboids: 4   Serratus anterior: 5     Right Shoulder     Isolated Muscles   Lower trapezius: 5   Middle trapezius: 5   Rhomboids: 5     Tests   Cervical     Left   Negative active compression ([place]).     Left Shoulder   Negative full can, Hawkin's, Neer's, painful arc and Speed's.     Additional Tests Details  L quadrant (+) mild pn reproduction  Spurling to L (+) inc tingling upper arm  Cervical distraction (+) reduction of symptoms          Assessment & Plan       Assessment  Impairments: abnormal or restricted ROM, activity intolerance, impaired physical strength, lacks appropriate home exercise program and pain with function   Functional limitations: lifting, uncomfortable because of pain, sitting and unable to perform repetitive tasks   Assessment details: Pt is a 40 YOM who presents to PT w/ complaint of L scapular and arm pn that onset in March while on bedrest recovering from colon surgery. Findings consistent w/ cervical radiculopathy. Symptoms much improved after oral steroids but pt cont to experience some residual pn that limits tolerance to prolonged sitting and daily activities. Endorses pn along medial scapular border that can radiate into his arm beyond his elbow, associated w/ occasional N/T. At time of exam today cervical and shoulder mobility WNL and mostly pn free. UE strength 5/5 w/ exception of mild mid trap/rhomboid weakness. Demo (-) shoulder exam, (+) spurling to L and reduction in symptoms w/ manual cervical distraction. Issued HEP consisting of self cervical/thoracic mobs, cervical retraction, and scapular strengthening exercises. Pt tolerated all well today. Pt would benefit from skilled PT services to address remaining deficits, decrease pn, and assist  w/ return to PLOF.  Barriers to therapy: n/a  Prognosis: good    Goals  Plan Goals: LTG 6wks  1) Pt to be independent w/ long term HEP and self mgmt.  2) Pt to report at least a 80% improvement in symptoms.  3) Pt to tolerate sitting for 2+ hours w/ min to no onset of symptoms.  4) Pt to improve QD score to 15% impairment or better to reflect improved pn and function.   5) Pt to demo 5/5 L scapular strength w/o pn on testing.      Plan  Therapy options: will be seen for skilled therapy services  Planned modality interventions: TENS, cryotherapy, thermotherapy (hydrocollator packs), ultrasound and dry needling  Planned therapy interventions: flexibility, functional ROM exercises, home exercise program, joint mobilization, manual therapy, neuromuscular re-education, postural training, soft tissue mobilization, strengthening, stretching and therapeutic activities  Frequency: 1x week  Duration in weeks: 12  Treatment plan discussed with: patient  Plan details: TE/TA/NMR/MT to address noted deficits, modalities as indicated for pn control        History # of Personal Factors and/or Comorbidities: LOW (0)  Examination of Body System(s): # of elements: LOW (1-2)  Clinical Presentation: EVOLVING  Clinical Decision Making: LOW       Timed:         Manual Therapy:    0     mins  67219;     Therapeutic Exercise:    15     mins  08703;     Neuromuscular Vince:    0    mins  80773;    Therapeutic Activity:     0     mins  46980;     Gait Trainin     mins  71963;     Ultrasound:     0     mins  38760;    Ionto                               0    mins   35981  Self Care                       0     mins   04515  Work Conditioning 1-2 hours    0    mins 56312  Work Conditioning ea add'l hour    0   mins 95775      Un-Timed:  Electrical Stimulation:    0     mins  64547 ( );  Dry Needling     0     mins self-pay  Traction     0     mins 14001  Low Eval     25     Mins 81585  Mod Eval     0     Mins 57801  High Eval                        0     Mins 72023  Re-Eval     0     Mins 84812  Canalith Repos    0     mins 45181      Timed Treatment:   25   mins   Total Treatment:     40   mins          PT: Leigh Sanches, PT     License Number: [id number]  Electronically signed by Leigh Sanches, PT, 07/11/25, 10:55 AM EDT    Certification Period: 7/11/2025 thru 10/8/2025  I certify that the therapy services are furnished while this patient is under my care.  The services outlined above are required by this patient, and will be reviewed every 90 days.         Physician Signature:__________________________________________________    PHYSICIAN: Bobo Fink MD  NPI: [id number]                                      DATE:

## 2025-07-23 ENCOUNTER — TREATMENT (OUTPATIENT)
Dept: PHYSICAL THERAPY | Facility: CLINIC | Age: 40
End: 2025-07-23
Payer: COMMERCIAL

## 2025-07-23 DIAGNOSIS — M25.512 ACUTE PAIN OF LEFT SHOULDER: Primary | ICD-10-CM

## 2025-07-23 NOTE — PROGRESS NOTES
Physical Therapy Daily Treatment Note                  [address]  [place] [address]      Patient: Clementina Segura   : 1985  Diagnosis/ICD-10 Code:  Acute pain of left shoulder [M25.512]  Referring practitioner: Bobo Fink MD  Date of Initial Visit: Type: THERAPY  Noted: 2025  Today's Date: 2025  Patient seen for 2 sessions             Subjective   Clementina Segura reports: having more pain with the HEP. Had to stop because pain was getting 10/10. The only thing that helped was resting reclined in bed for a couple days and now it is back to being manageable.     Objective   Decrease in neck pain with manual traction    thoracic rotation in side lying   L 90%  R 100%  See Exercise, Manual, and Modality Logs for complete treatment.       Assessment/Plan  Able to decrease left shoulder blade pain with manual cervical traction. Followed up with exercises in supine position with no increase in pain. Noted limitation in thoracic rotation to the left and able to return fully with open books stretching with more scapular pain relief. Decrease in pain and increase in ROM post treatment. New HEP based on interventions in session. F/u on later in the day if any symptoms arise.     Access Code: [email or web address]  URL: [email or web address]  Date: 2025  Prepared by: Leigh Ballard    Exercises  - Supine Chin Tuck  - 2-3 x daily - 1 sets - 10 reps - 3 sec hold  - Supine Shoulder Flexion Extension AAROM with Dowel  - 2 x daily - 1 sets - 20 reps  - Supine Shoulder Horizontal Abduction with Resistance  - 2 x daily - 2 sets - 10 reps  - Sidelying Thoracic Rotation with Open Book (Mirrored)  - 2 x daily - 1 sets - 10 reps - 5 sec hold  - Sidelying Shoulder ER with Towel and Dumbbell (Mirrored)  - 2 x daily - 1 sets - 15 reps  Progress per Plan of Care and Progress strengthening /stabilization /functional activity           Timed:  Manual Therapy:    10      mins  15599;  Therapeutic Exercise:    15     mins  13217;     Neuromuscular Vince:   15    mins  52498;    Therapeutic Activity:      8    mins  76680;     Gait Training:           mins  32785;     Ultrasound:          mins  28316;    Electrical Stimulation:         mins  22623;  Iontophoresis          mins  46677;  Work Conditioning 1-2 hr ___ mins 31911;  Work Conditioning ea additional hr ___ mins 42859    Untimed:  Electrical Stimulation:         mins  10136 ( );  Mechanical Traction:         mins  57037;   Fluidotherapy          mins  58250    Timed Treatment:   48   mins   Total Treatment:     48   mins        Leigh Ballard PTA  Physical Therapist Assistant

## 2025-08-15 ENCOUNTER — TREATMENT (OUTPATIENT)
Dept: PHYSICAL THERAPY | Facility: CLINIC | Age: 40
End: 2025-08-15
Payer: COMMERCIAL

## 2025-08-15 DIAGNOSIS — M25.512 ACUTE PAIN OF LEFT SHOULDER: Primary | ICD-10-CM
